# Patient Record
Sex: MALE | Race: WHITE | NOT HISPANIC OR LATINO | Employment: FULL TIME | URBAN - METROPOLITAN AREA
[De-identification: names, ages, dates, MRNs, and addresses within clinical notes are randomized per-mention and may not be internally consistent; named-entity substitution may affect disease eponyms.]

---

## 2023-05-12 ENCOUNTER — OFFICE VISIT (OUTPATIENT)
Dept: MEDICAL GROUP | Facility: MEDICAL CENTER | Age: 28
End: 2023-05-12
Payer: COMMERCIAL

## 2023-05-12 VITALS
RESPIRATION RATE: 16 BRPM | BODY MASS INDEX: 29.06 KG/M2 | HEART RATE: 62 BPM | TEMPERATURE: 98.2 F | SYSTOLIC BLOOD PRESSURE: 118 MMHG | HEIGHT: 70 IN | DIASTOLIC BLOOD PRESSURE: 76 MMHG | OXYGEN SATURATION: 98 % | WEIGHT: 203 LBS

## 2023-05-12 DIAGNOSIS — L90.5 SCAR OF FINGER: ICD-10-CM

## 2023-05-12 DIAGNOSIS — Z76.89 ENCOUNTER TO ESTABLISH CARE WITH NEW DOCTOR: ICD-10-CM

## 2023-05-12 DIAGNOSIS — Z00.00 PREVENTATIVE HEALTH CARE: ICD-10-CM

## 2023-05-12 PROCEDURE — 3078F DIAST BP <80 MM HG: CPT | Performed by: STUDENT IN AN ORGANIZED HEALTH CARE EDUCATION/TRAINING PROGRAM

## 2023-05-12 PROCEDURE — 3074F SYST BP LT 130 MM HG: CPT | Performed by: STUDENT IN AN ORGANIZED HEALTH CARE EDUCATION/TRAINING PROGRAM

## 2023-05-12 PROCEDURE — 99203 OFFICE O/P NEW LOW 30 MIN: CPT | Performed by: STUDENT IN AN ORGANIZED HEALTH CARE EDUCATION/TRAINING PROGRAM

## 2023-05-12 ASSESSMENT — PATIENT HEALTH QUESTIONNAIRE - PHQ9: CLINICAL INTERPRETATION OF PHQ2 SCORE: 0

## 2023-05-12 NOTE — PROGRESS NOTES
"Subjective:     CC:  Diagnoses of BMI 29.0-29.9,adult, Scar of finger, Preventative health care, and Encounter to establish care with new doctor were pertinent to this visit.    HISTORY OF THE PRESENT ILLNESS: Patient is a 28 y.o. male. This pleasant patient is here today to establish care and discuss chronic conditions. His prior PCP was none.    Problem   Bmi 29.0-29.9,Adult    Chronic condition. He tries to eat healthy in general. He does not regularly exercise.       Preventative Health Care    Patient is here to establish care today. Feels well overall.       Scar of Finger    Chronic condition.    Character: swelling of right 5th finger  Onset: several months  Location: right 5th finger  Duration: intermittent  Exacerbating factors: bumping finger into things  Relieving factors: draining pus from it  Associated symptoms: none  Severity: persistent and recurrent         No current Epic-ordered outpatient medications on file.     No current Epic-ordered facility-administered medications on file.     Social history  Living situation: lives with wife at home  Occupation: works as   Marital status:   Alcohol/tobacco/illicit drugs: denies tobacco or EtOH, nightly marijuana    ROS:   See HPI      Objective:     Exam: /76   Pulse 62   Temp 36.8 °C (98.2 °F) (Temporal)   Resp 16   Ht 1.778 m (5' 10\")   Wt 92.1 kg (203 lb)   SpO2 98%  Body mass index is 29.13 kg/m².    General: Normal appearing. No distress.  HEENT: Normocephalic.   Neck: Supple without JVD or bruit. Thyroid is not enlarged.  Pulmonary: Clear to ausculation. Normal effort. No rales, ronchi, or wheezing.  Cardiovascular: Regular rate and rhythm without murmur.  Skin: Warm and dry.  There is mild edematous scar tissue to right 5th PIP joint with healing puncture.  Musculoskeletal: Normal gait. No extremity cyanosis, clubbing, or edema.  Psych: Normal mood and affect. Alert and oriented x3. Judgment and insight is " normal.    Labs: No recent lab results available for review at this time    Assessment & Plan:   28 y.o. male with the following -    1. BMI 29.0-29.9,adult  - Comp Metabolic Panel; Future  - HEMOGLOBIN A1C; Future  - Lipid Profile; Future  - TSH WITH REFLEX TO FT4; Future    2. Scar of finger  Chronic condition, stable but recurrent. No evidence of infection at this time.  - advised to avoid cutting it open at home and come in for evaluation next time it flares up    3. Preventative health care  - Comp Metabolic Panel; Future  - HEMOGLOBIN A1C; Future  - Lipid Profile; Future  - TSH WITH REFLEX TO FT4; Future  - HEP C VIRUS ANTIBODY; Future  - HIV AG/AB COMBO ASSAY SCREENING; Future  - HEP B SURFACE AB; Future    4. Encounter to establish care with new doctor      Return in about 3 weeks (around 6/2/2023).    Please note that this dictation was created using voice recognition software. I have made every reasonable attempt to correct obvious errors, but I expect that there are errors of grammar and possibly content that I did not discover before finalizing the note.

## 2023-05-13 ENCOUNTER — HOSPITAL ENCOUNTER (OUTPATIENT)
Dept: LAB | Facility: MEDICAL CENTER | Age: 28
End: 2023-05-13
Attending: STUDENT IN AN ORGANIZED HEALTH CARE EDUCATION/TRAINING PROGRAM
Payer: COMMERCIAL

## 2023-05-13 DIAGNOSIS — Z00.00 PREVENTATIVE HEALTH CARE: ICD-10-CM

## 2023-05-13 LAB
ALBUMIN SERPL BCP-MCNC: 4.5 G/DL (ref 3.2–4.9)
ALBUMIN/GLOB SERPL: 1.6 G/DL
ALP SERPL-CCNC: 60 U/L (ref 30–99)
ALT SERPL-CCNC: 40 U/L (ref 2–50)
ANION GAP SERPL CALC-SCNC: 13 MMOL/L (ref 7–16)
AST SERPL-CCNC: 17 U/L (ref 12–45)
BILIRUB SERPL-MCNC: 0.4 MG/DL (ref 0.1–1.5)
BUN SERPL-MCNC: 20 MG/DL (ref 8–22)
CALCIUM ALBUM COR SERPL-MCNC: 9 MG/DL (ref 8.5–10.5)
CALCIUM SERPL-MCNC: 9.4 MG/DL (ref 8.5–10.5)
CHLORIDE SERPL-SCNC: 106 MMOL/L (ref 96–112)
CHOLEST SERPL-MCNC: 220 MG/DL (ref 100–199)
CO2 SERPL-SCNC: 26 MMOL/L (ref 20–33)
CREAT SERPL-MCNC: 0.93 MG/DL (ref 0.5–1.4)
EST. AVERAGE GLUCOSE BLD GHB EST-MCNC: 103 MG/DL
FASTING STATUS PATIENT QL REPORTED: NORMAL
GFR SERPLBLD CREATININE-BSD FMLA CKD-EPI: 114 ML/MIN/1.73 M 2
GLOBULIN SER CALC-MCNC: 2.8 G/DL (ref 1.9–3.5)
GLUCOSE SERPL-MCNC: 91 MG/DL (ref 65–99)
HBA1C MFR BLD: 5.2 % (ref 4–5.6)
HBV SURFACE AB SERPL IA-ACNC: 2445 MIU/ML (ref 0–10)
HCV AB SER QL: NORMAL
HDLC SERPL-MCNC: 46 MG/DL
HIV 1+2 AB+HIV1 P24 AG SERPL QL IA: NORMAL
LDLC SERPL CALC-MCNC: 153 MG/DL
POTASSIUM SERPL-SCNC: 4.3 MMOL/L (ref 3.6–5.5)
PROT SERPL-MCNC: 7.3 G/DL (ref 6–8.2)
SODIUM SERPL-SCNC: 145 MMOL/L (ref 135–145)
TRIGL SERPL-MCNC: 106 MG/DL (ref 0–149)
TSH SERPL DL<=0.005 MIU/L-ACNC: 1.32 UIU/ML (ref 0.38–5.33)

## 2023-05-13 PROCEDURE — 80053 COMPREHEN METABOLIC PANEL: CPT

## 2023-05-13 PROCEDURE — 80061 LIPID PANEL: CPT

## 2023-05-13 PROCEDURE — 87389 HIV-1 AG W/HIV-1&-2 AB AG IA: CPT

## 2023-05-13 PROCEDURE — 83036 HEMOGLOBIN GLYCOSYLATED A1C: CPT

## 2023-05-13 PROCEDURE — 86803 HEPATITIS C AB TEST: CPT

## 2023-05-13 PROCEDURE — 84443 ASSAY THYROID STIM HORMONE: CPT

## 2023-05-13 PROCEDURE — 36415 COLL VENOUS BLD VENIPUNCTURE: CPT

## 2023-05-13 PROCEDURE — 86706 HEP B SURFACE ANTIBODY: CPT

## 2023-10-27 ENCOUNTER — DOCUMENTATION (OUTPATIENT)
Dept: HEALTH INFORMATION MANAGEMENT | Facility: OTHER | Age: 28
End: 2023-10-27
Payer: COMMERCIAL

## 2025-04-30 ENCOUNTER — OFFICE VISIT (OUTPATIENT)
Dept: URGENT CARE | Facility: PHYSICIAN GROUP | Age: 30
End: 2025-04-30
Payer: COMMERCIAL

## 2025-04-30 VITALS
OXYGEN SATURATION: 97 % | HEART RATE: 65 BPM | WEIGHT: 203.6 LBS | HEIGHT: 70 IN | TEMPERATURE: 97.5 F | BODY MASS INDEX: 29.15 KG/M2 | RESPIRATION RATE: 16 BRPM | DIASTOLIC BLOOD PRESSURE: 80 MMHG | SYSTOLIC BLOOD PRESSURE: 118 MMHG

## 2025-04-30 DIAGNOSIS — L03.012 ACUTE PARONYCHIA OF LEFT THUMB: ICD-10-CM

## 2025-04-30 RX ORDER — MUPIROCIN 20 MG/G
1 OINTMENT TOPICAL 3 TIMES DAILY
Qty: 22 G | Refills: 0 | Status: SHIPPED | OUTPATIENT
Start: 2025-04-30 | End: 2025-05-07

## 2025-04-30 RX ORDER — SULFAMETHOXAZOLE AND TRIMETHOPRIM 800; 160 MG/1; MG/1
1 TABLET ORAL 2 TIMES DAILY
Qty: 10 TABLET | Refills: 0 | Status: SHIPPED | OUTPATIENT
Start: 2025-04-30 | End: 2025-05-05

## 2025-04-30 NOTE — PROGRESS NOTES
"Subjective:     Vikash Cunningham is a 30 y.o. male who presents for Finger Injury (PT states L thumb possible infection . Two weeks of swelling and pain.  Possible hydraulic fluid caused laceration/injury. )      0107    Pt presents for evaluation of a new problem. Saleem is a very pleasant 30-year-old male presents to urgent care today with complaints of pain and swelling around his left thumbnail that he originally noticed 2 weeks ago.  His symptoms are slightly worsening over time.  There has been no drainage or bleeding.  Patient denies any known injury.    ROS    PMH: No past medical history on file.  ALLERGIES: No Known Allergies  SURGHX: No past surgical history on file.  SOCHX:   Social History     Socioeconomic History    Marital status:    Tobacco Use    Smoking status: Never    Smokeless tobacco: Never     FH:   Family History   Problem Relation Age of Onset    Breast Cancer Maternal Aunt          Objective:   /80   Pulse 65   Temp 36.4 °C (97.5 °F) (Temporal)   Resp 16   Ht 1.778 m (5' 10\")   Wt 92.4 kg (203 lb 9.6 oz)   SpO2 97%   BMI 29.21 kg/m²     Physical Exam  Vitals and nursing note reviewed.   Constitutional:       General: He is not in acute distress.     Appearance: Normal appearance. He is normal weight. He is not ill-appearing or toxic-appearing.   HENT:      Head: Normocephalic.      Right Ear: External ear normal.      Left Ear: External ear normal.      Nose: Nose normal.      Mouth/Throat:      Mouth: Mucous membranes are moist.   Eyes:      General:         Right eye: No discharge.         Left eye: No discharge.      Extraocular Movements: Extraocular movements intact.      Conjunctiva/sclera: Conjunctivae normal.      Pupils: Pupils are equal, round, and reactive to light.   Pulmonary:      Effort: Pulmonary effort is normal.      Breath sounds: Normal breath sounds.   Abdominal:      General: Abdomen is flat.   Musculoskeletal:         General: Normal " range of motion.      Cervical back: Normal range of motion and neck supple. No rigidity.   Lymphadenopathy:      Cervical: No cervical adenopathy.   Skin:     General: Skin is warm and dry.      Comments: Mild swelling surrounding nailbed left thumb.  No active drainage or bleeding.  There is tenderness with palpation.   Neurological:      General: No focal deficit present.      Mental Status: He is alert and oriented to person, place, and time. Mental status is at baseline.   Psychiatric:         Mood and Affect: Mood normal.         Behavior: Behavior normal.         Judgment: Judgment normal.         Assessment/Plan:   Assessment      1. Acute paronychia of left thumb  sulfamethoxazole-trimethoprim (BACTRIM DS) 800-160 MG tablet    mupirocin (BACTROBAN) 2 % Ointment        Procedure of I&D discussed with patient.  Finger was soaked in warm water and Hibiclens and we cleansed with alcohol swab.  Pain-alea was applied for relief of discomfort and 1% lidocaine without epinephrine injected into area of fluctuance.  Fluctuant area was then injected with 18-gauge needle with scant amounts of purulent drainage removed.  Dressing applied with Polysporin and Band-Aid.  We did discuss continuing with warm soaks and he was started on Bactrim and mupirocin ointment.  Patient to return for worsening symptoms of infection including pain, swelling and drainage.  He is in agreement with plan of care today.